# Patient Record
Sex: FEMALE | URBAN - METROPOLITAN AREA
[De-identification: names, ages, dates, MRNs, and addresses within clinical notes are randomized per-mention and may not be internally consistent; named-entity substitution may affect disease eponyms.]

---

## 2023-07-07 ENCOUNTER — OFFICE VISIT (OUTPATIENT)
Facility: LOCATION | Age: 43
End: 2023-07-07
Payer: COMMERCIAL

## 2023-07-07 DIAGNOSIS — H47.10 UNSPECIFIED PAPILLEDEMA: Primary | ICD-10-CM

## 2023-07-07 PROCEDURE — 92004 COMPRE OPH EXAM NEW PT 1/>: CPT | Performed by: OPHTHALMOLOGY

## 2023-07-07 ASSESSMENT — INTRAOCULAR PRESSURE
OS: 17
OD: 16

## 2023-07-07 ASSESSMENT — KERATOMETRY
OD: 44.49
OS: 44.69

## 2023-07-07 NOTE — IMPRESSION/PLAN
Impression: Unspecified papilledema: H47.10. Plan: MRI MRV unremarkable. No recent severe HAs. +visual disturance.